# Patient Record
Sex: MALE | URBAN - METROPOLITAN AREA
[De-identification: names, ages, dates, MRNs, and addresses within clinical notes are randomized per-mention and may not be internally consistent; named-entity substitution may affect disease eponyms.]

---

## 2021-05-27 ENCOUNTER — TRANSFERRED RECORDS (OUTPATIENT)
Dept: HEALTH INFORMATION MANAGEMENT | Facility: CLINIC | Age: 41
End: 2021-05-27

## 2021-08-29 ENCOUNTER — TRANSFERRED RECORDS (OUTPATIENT)
Dept: HEALTH INFORMATION MANAGEMENT | Facility: CLINIC | Age: 41
End: 2021-08-29

## 2021-09-02 ENCOUNTER — TRANSFERRED RECORDS (OUTPATIENT)
Dept: HEALTH INFORMATION MANAGEMENT | Facility: CLINIC | Age: 41
End: 2021-09-02

## 2022-02-21 ENCOUNTER — TRANSFERRED RECORDS (OUTPATIENT)
Dept: HEALTH INFORMATION MANAGEMENT | Facility: CLINIC | Age: 42
End: 2022-02-21

## 2022-02-21 LAB
ALT SERPL-CCNC: 26 U/L (ref 0–40)
AST SERPL-CCNC: 27 U/L (ref 0–40)
CREATININE (EXTERNAL): 1.08 MG/DL (ref 0.67–1.17)
GFR ESTIMATED (EXTERNAL): 84 ML/MIN/1.73M2
GLUCOSE (EXTERNAL): 100 MG/DL (ref 74–106)
POTASSIUM (EXTERNAL): 4 MMOL/L (ref 3.5–5.1)

## 2022-06-20 ENCOUNTER — TRANSFERRED RECORDS (OUTPATIENT)
Dept: HEALTH INFORMATION MANAGEMENT | Facility: CLINIC | Age: 42
End: 2022-06-20

## 2023-01-30 ENCOUNTER — TRANSFERRED RECORDS (OUTPATIENT)
Dept: HEALTH INFORMATION MANAGEMENT | Facility: CLINIC | Age: 43
End: 2023-01-30

## 2023-02-06 ENCOUNTER — TRANSFERRED RECORDS (OUTPATIENT)
Dept: HEALTH INFORMATION MANAGEMENT | Facility: CLINIC | Age: 43
End: 2023-02-06

## 2023-02-06 LAB
ALT SERPL-CCNC: 21 U/L (ref 0–40)
AST SERPL-CCNC: 31 U/L (ref 0–40)
CREATININE (EXTERNAL): 1.03 MG/DL (ref 0.67–1.17)
GFR ESTIMATED (EXTERNAL): 92 ML/MIN/1.73M2
GLUCOSE (EXTERNAL): 90 MG/DL (ref 74–106)
POTASSIUM (EXTERNAL): 3.7 MMOL/L (ref 3.5–5.1)

## 2023-02-07 ENCOUNTER — TRANSFERRED RECORDS (OUTPATIENT)
Dept: HEALTH INFORMATION MANAGEMENT | Facility: CLINIC | Age: 43
End: 2023-02-07

## 2023-02-09 ENCOUNTER — TRANSFERRED RECORDS (OUTPATIENT)
Dept: HEALTH INFORMATION MANAGEMENT | Facility: CLINIC | Age: 43
End: 2023-02-09

## 2023-02-13 ENCOUNTER — MEDICAL CORRESPONDENCE (OUTPATIENT)
Dept: HEALTH INFORMATION MANAGEMENT | Facility: CLINIC | Age: 43
End: 2023-02-13

## 2023-02-13 ENCOUNTER — TRANSFERRED RECORDS (OUTPATIENT)
Dept: HEALTH INFORMATION MANAGEMENT | Facility: CLINIC | Age: 43
End: 2023-02-13
Payer: COMMERCIAL

## 2023-02-15 ENCOUNTER — TRANSCRIBE ORDERS (OUTPATIENT)
Dept: OTHER | Age: 43
End: 2023-02-15

## 2023-02-15 ENCOUNTER — PATIENT OUTREACH (OUTPATIENT)
Dept: SURGERY | Facility: CLINIC | Age: 43
End: 2023-02-15
Payer: COMMERCIAL

## 2023-02-15 DIAGNOSIS — C18.2 MALIGNANT NEOPLASM OF ASCENDING COLON (H): Primary | ICD-10-CM

## 2023-02-15 DIAGNOSIS — C18.9 COLON ADENOCARCINOMA (H): Primary | ICD-10-CM

## 2023-02-15 DIAGNOSIS — C18.9 COLON CANCER (H): Primary | ICD-10-CM

## 2023-02-15 NOTE — CONFIDENTIAL NOTE
Colonoscopy        CT A/P 2/7        CEA 2/6 2.0    Scheduled 2/23 with Dr. Moreno for new colon cancer consult

## 2023-02-15 NOTE — TELEPHONE ENCOUNTER
Diagnosis, Referred by & from: Ascending Colon Cancer, no flex   Appt date: 2/23/2023   NOTES STATUS DETAILS   OFFICE NOTE from referring provider Received [facility]:  2/9/23, 1/30/23- GI OV with Dr. Anaya   OFFICE NOTE from other specialist Received / Care Everywhere [facility]:  2/9/23 - GI OV with Dr. Toro  4/23/21 - [facility] OV with Dr. Negron    [facility]:  1/30/23, 6/20/22 - GI OV with Dr. Toro   DISCHARGE SUMMARY from hospital Care Everywhere [facility]:  8/29/21 - Admission with Dr. Snider   DISCHARGE REPORT from the ER Received [facility]:  8/29/21 - ED OV with [facility]   OPERATIVE REPORT N/A    MEDICATION LIST Care Everywhere    LABS     ANAL PAP/CEA Received [facility]:  2/6/23 - CEA   BIOPSIES/PATHOLOGY RELATED TO DIAGNOSIS Received [facility]:  2/6/23 - Colon Biopsy (Case: [id number])  12/20/21 - Rectum Biopsy (Case: [id number])   DIAGNOSTIC PROCEDURES     PFC TESTING (from the Pelvic floor center includes Manometry, PDNL, EMG, etc.) N/A    COLONOSCOPY Received [facility]:  2/6/23 - Colonoscopy  12/20/21 - Colonoscopy   UPPER ENDOSCOPY (EGD) Received [facility]:  2/6/23 - EGD   FLEX SIGMOIDOSCOPY N/A    ERCP N/A    IMAGING (DISC & REPORT)      CT In process [facility]:  2/7/23 - CT Abd/Pelvis  8/29/21 - CT Abd/Pelvis   MRI N/A    XRAY N/A    ULTRASOUND  (ENDOANAL/ENDORECTAL) In process [facility]:  8/30/21 - US Abdomen     Records Requested  02/15/23    Facility  [facility]  Fax: [phone]  [facility]  Fax: [phone]  [facility]  Fax: [phone]   Outcome * 2/15/23 1:01 PM Faxed urg req to [facility], [facility], and [facility] for records and images to be sent to the clinic. - [place]    * 2/15/23 2:40 PM Records received from [facility] and sent to [facility] to be scanned into the chart. - [place]    * 2/17/23 11:59 AM Records received from [facility] and sent to [facility] to be scanned into the chart. - [place]    * 2/20/23 8:02 AM Received message from [facility] stating Imaging disc being Fed'Exd. - [place]

## 2023-02-16 ENCOUNTER — HOSPITAL ENCOUNTER (INPATIENT)
Dept: GENERAL RADIOLOGY | Facility: CLINIC | Age: 43
Discharge: HOME | End: 2023-02-16
Attending: SURGERY
Payer: COMMERCIAL

## 2023-02-16 DIAGNOSIS — C18.2 MALIGNANT NEOPLASM OF ASCENDING COLON (H): ICD-10-CM

## 2023-02-23 ENCOUNTER — PRE VISIT (OUTPATIENT)
Dept: SURGERY | Facility: CLINIC | Age: 43
End: 2023-02-23